# Patient Record
Sex: FEMALE | Race: WHITE | NOT HISPANIC OR LATINO | Employment: FULL TIME | URBAN - METROPOLITAN AREA
[De-identification: names, ages, dates, MRNs, and addresses within clinical notes are randomized per-mention and may not be internally consistent; named-entity substitution may affect disease eponyms.]

---

## 2017-08-30 ENCOUNTER — OFFICE VISIT (OUTPATIENT)
Dept: FAMILY MEDICINE | Facility: CLINIC | Age: 32
End: 2017-08-30

## 2017-08-30 VITALS
WEIGHT: 114.1 LBS | HEIGHT: 65 IN | DIASTOLIC BLOOD PRESSURE: 57 MMHG | HEART RATE: 79 BPM | BODY MASS INDEX: 19.01 KG/M2 | SYSTOLIC BLOOD PRESSURE: 99 MMHG | TEMPERATURE: 98 F | OXYGEN SATURATION: 98 % | RESPIRATION RATE: 20 BRPM

## 2017-08-30 DIAGNOSIS — Z32.01 PREGNANCY TEST POSITIVE: Primary | ICD-10-CM

## 2017-08-30 LAB — BETA HCG QUAL IFA URINE: POSITIVE

## 2017-08-30 PROCEDURE — 84703 CHORIONIC GONADOTROPIN ASSAY: CPT | Performed by: PHYSICIAN ASSISTANT

## 2017-08-30 PROCEDURE — 99213 OFFICE O/P EST LOW 20 MIN: CPT | Performed by: PHYSICIAN ASSISTANT

## 2017-08-30 ASSESSMENT — PAIN SCALES - GENERAL: PAINLEVEL: NO PAIN (0)

## 2017-08-30 NOTE — NURSING NOTE
"Chief Complaint   Patient presents with     possible pregnancy     LMP 7/10/2017       Initial BP 99/57 (BP Location: Right arm, Patient Position: Chair, Cuff Size: Adult Regular)  Pulse 79  Temp 98  F (36.7  C) (Oral)  Resp 20  Ht 5' 5\" (1.651 m)  Wt 114 lb 1.6 oz (51.8 kg)  LMP 07/10/2017  SpO2 98%  BMI 18.99 kg/m2 Estimated body mass index is 18.99 kg/(m^2) as calculated from the following:    Height as of this encounter: 5' 5\" (1.651 m).    Weight as of this encounter: 114 lb 1.6 oz (51.8 kg).  Medication Reconciliation: complete   Doris Tarango CMA        "

## 2017-08-30 NOTE — MR AVS SNAPSHOT
"              After Visit Summary   8/30/2017    Caprice Brenner    MRN: [id number]           Patient Information     Date Of Birth          1985        Visit Information        Provider Department      8/30/2017 1:20 PM Luis Eduardo Schwab PA-C [facility]        Today's Diagnoses     Pregnancy test positive    -  1       Follow-ups after your visit        Your next 10 appointments already scheduled     Sep 13, 2017 11:15 AM CDT   New Prenatal with Matthew Stein MD   [facility]    [address] [place] [id number] [phone]              Who to contact     If you have questions or need follow up information about today's clinic visit or your schedule please contact [facility] directly at [phone].  Normal or non-critical lab and imaging results will be communicated to you by MyChart, letter or phone within 4 business days after the clinic has received the results. If you do not hear from us within 7 days, please contact the clinic through MyChart or phone. If you have a critical or abnormal lab result, we will notify you by phone as soon as possible.  Submit refill requests through [employer] or call your pharmacy and they will forward the refill request to us. Please allow 3 business days for your refill to be completed.          Additional Information About Your Visit        MyChart Information     [employer] lets you send messages to your doctor, view your test results, renew your prescriptions, schedule appointments and more. To sign up, go to [email or web address] . Click on \"Log in\" on the left side of the screen, which will take you to the Welcome page. Then click on \"Sign up Now\" on the right side of the page.     You will be asked to enter the access code listed below, as well as some personal information. Please follow the directions to create your username and password.   " "  Your access code is: [email or web address]  Expires: 2017  2:25 PM     Your access code will  in 90 days. If you need help or a new code, please call your [place] clinic or [phone].        Care EveryWhere ID     This is your Care EveryWhere ID. This could be used by other organizations to access your [place] medical records  [id number]        Your Vitals Were     Pulse Temperature Respirations Height Last Period Pulse Oximetry    79 98  F (36.7  C) (Oral) 20 5' 5\" (1.651 m) 07/10/2017 98%    BMI (Body Mass Index)                   18.99 kg/m2            Blood Pressure from Last 3 Encounters:   17 99/57    Weight from Last 3 Encounters:   17 114 lb 1.6 oz (51.8 kg)              We Performed the Following     Beta HCG qual IFA urine        Primary Care Provider    No Pcp Confirmed       No address on file        Equal Access to Services     JUAN RAMON COHEN : Hadii aad ku hadasho Sobernaali, waaxda luqadaha, qaybta kaalmada adeegyada, waxay olivein haychristiann juan luis marino . So [facility] [phone].    ATENCIÓN: Si habla español, tiene a vargas disposición servicios gratuitos de asistencia lingüística. David al [phone].    We comply with applicable federal civil rights laws and [place] laws. We do not discriminate on the basis of race, color, national origin, age, disability sex, sexual orientation or gender identity.            Thank you!     Thank you for choosing [facility]  for your care. Our goal is always to provide you with excellent care. Hearing back from our patients is one way we can continue to improve our services. Please take a few minutes to complete the written survey that you may receive in the mail after your visit with us. Thank you!             Your Updated Medication List - Protect others around you: Learn how to safely use, store and throw away your medicines at www.disposemymeds.org.      Notice  As of 2017  2:25 PM    You have not been prescribed " any medications.

## 2017-08-30 NOTE — PROGRESS NOTES
"  SUBJECTIVE:   Caprice Brenner is a 32 year old female who presents to clinic today for the following health issues:      Pregnancy trest, LMP 7/10/2017, 3 positive home tests.         Problem list and histories reviewed & adjusted, as indicated.  Additional history: Patient has been trying to conceive.       Reviewed and updated as needed this visit by clinical staff  Allergies  Meds       ROS:  Constitutional, HEENT, cardiovascular, pulmonary, gi and gu systems are negative, except as otherwise noted.      OBJECTIVE:   BP 99/57 (BP Location: Right arm, Patient Position: Chair, Cuff Size: Adult Regular)  Pulse 79  Temp 98  F (36.7  C) (Oral)  Resp 20  Ht 5' 5\" (1.651 m)  Wt 114 lb 1.6 oz (51.8 kg)  LMP 07/10/2017  SpO2 98%  BMI 18.99 kg/m2  Body mass index is 18.99 kg/(m^2).    Total visit time is 15 Minutes with > 10 Minutes spent in care and consultation regarding Positive pregnancy testing with Prenatal vit orders and follow up plan.      Diagnostic Test Results:  Results for orders placed or performed in visit on 08/30/17 (from the past 24 hour(s))   Beta HCG qual IFA urine   Result Value Ref Range    Beta HCG Qual IFA Urine Positive (A) NEG^Negative          ASSESSMENT/PLAN:   1. Pregnancy test positive  - Beta HCG qual IFA urine    Schedule first OB PE.  Patient amenable to this follow up plan.     Luis Eduardo Schwab PA-C  [facility]  "

## 2017-09-05 ENCOUNTER — TELEPHONE (OUTPATIENT)
Dept: OBGYN | Facility: CLINIC | Age: 32
End: 2017-09-05

## 2017-09-05 NOTE — TELEPHONE ENCOUNTER
Patient called back.  She stated she is wanting to have an ultrasound done.  Her family members and friends have had ultrasounds early in pregnancy so she would like one too.  She is not sure of her LMP but indicated at the confirmation appointment that it was around 7/10/2017.  I advised patient to discuss this at the upcoming visit.  I put the request in her appointment notes.  Patient denied concerns with this pregnancy.    Jacinta Nelson RN

## 2017-09-05 NOTE — TELEPHONE ENCOUNTER
Reason for Call:  Other appointment    Detailed comments: Antonia called regarding her upcoming OB/GYN appointment on 09/13. She would like to get an ultrasound done at this appointment. Please call and let her know if this is at all possible.     Phone Number Patient can be reached at: Home number on file [phone] (home)    Best Time: Any    Can we leave a detailed message on this number? YES    Call taken on 9/5/2017 at 11:22 AM by Leta Cooper

## 2017-09-05 NOTE — TELEPHONE ENCOUNTER
I left message on Voicemail that US usually is not ordered ahead of time. First establish care with Dr. Stein for 1st OB and he will listen for heart tones and discuss if US needed at the visit.    Told to return call if she has further questions.  EVAN Diaz 9/5/2017

## 2017-09-06 ENCOUNTER — TELEPHONE (OUTPATIENT)
Dept: FAMILY MEDICINE | Facility: CLINIC | Age: 32
End: 2017-09-06

## 2017-09-06 NOTE — TELEPHONE ENCOUNTER
Reason for Call:  Other Fax    Detailed comments: Pt calling for she would like a copy of confirmation of her pregnancy faxed to fax # [phone] attn: Nanci Jiang    Phone Number Patient can be reached at: Home number on file [phone] (home)    Best Time: anytime    Can we leave a detailed message on this number? YES    Call taken on 9/6/2017 at 2:18 PM by Gentry Franco

## 2023-01-30 ENCOUNTER — TELEPHONE (OUTPATIENT)
Dept: DERMATOLOGY | Facility: CLINIC | Age: 38
End: 2023-01-30
Payer: COMMERCIAL

## 2023-01-30 NOTE — TELEPHONE ENCOUNTER
Called and informed patient of aftercare with shave vs punch biopsy as well as not to submerge in water following biopsy until completely healed. Patient states she has a mole on her neck, eyebrow and chest that are growing to which she would like to be looked at. Patient is scheduled 3/27 with Dr. Dom Singh LPN

## 2023-01-30 NOTE — TELEPHONE ENCOUNTER
[facility] Call Center    Phone Message    May a detailed message be left on voicemail: no     Reason for Call: Other: Pt would like to know the aftercare for mole removal and what, if any, down time there is. Please call Pt back to discuss. Pt is scheduled for 3/27/23. Thank you.      Action Taken: Message routed to:  Adult Clinics: Dermatology p [phone]    Travel Screening: Not Applicable

## 2023-03-27 ENCOUNTER — OFFICE VISIT (OUTPATIENT)
Dept: DERMATOLOGY | Facility: CLINIC | Age: 38
End: 2023-03-27
Payer: COMMERCIAL

## 2023-03-27 DIAGNOSIS — L82.1 SEBORRHEIC KERATOSIS: ICD-10-CM

## 2023-03-27 DIAGNOSIS — D23.9 DERMAL NEVUS: ICD-10-CM

## 2023-03-27 DIAGNOSIS — L82.0 SEBORRHEIC KERATOSIS, INFLAMED: ICD-10-CM

## 2023-03-27 DIAGNOSIS — D22.9 MULTIPLE BENIGN NEVI: Primary | ICD-10-CM

## 2023-03-27 DIAGNOSIS — L81.4 SOLAR LENTIGO: ICD-10-CM

## 2023-03-27 PROCEDURE — 2894A DERMATOPATHOLOGY EXAM: CPT | Performed by: DERMATOLOGY

## 2023-03-27 PROCEDURE — 11305 SHAVE SKIN LESION 0.5 CM/<: CPT | Mod: XS | Performed by: DERMATOLOGY

## 2023-03-27 PROCEDURE — 17110 DESTRUCTION B9 LES UP TO 14: CPT | Performed by: DERMATOLOGY

## 2023-03-27 PROCEDURE — 88305 TISSUE EXAM BY PATHOLOGIST: CPT | Performed by: DERMATOLOGY

## 2023-03-27 PROCEDURE — 99203 OFFICE O/P NEW LOW 30 MIN: CPT | Mod: 25 | Performed by: DERMATOLOGY

## 2023-03-27 NOTE — PATIENT INSTRUCTIONS
Wound Care After a Biopsy    What is a skin biopsy?  A skin biopsy allows the doctor to examine a very small piece of tissue under the microscope to determine the diagnosis and the best treatment for the skin condition. A local anesthetic (numbing medicine)  is injected with a very small needle into the skin area to be tested. A small piece of skin is taken from the area. Sometimes a suture (stitch) is used.     What are the risks of a skin biopsy?  I will experience scar, bleeding, swelling, pain, crusting and redness. I may experience incomplete removal or recurrence. Risks of this procedure are excessive bleeding, bruising, infection, nerve damage, numbness, thick (hypertrophic or keloidal) scar and non-diagnostic biopsy.    How should I care for my wound for the first 24 hours?  Keep the wound dry and covered for 24 hours  If it bleeds, hold direct pressure on the area for 15 minutes. If bleeding does not stop then go to the emergency room  Avoid strenuous exercise the first 1-2 days or as your doctor instructs you    How should I care for the wound after 24 hours?  After 24 hours, remove the bandage  You may bathe or shower as normal  If you had a scalp biopsy, you can shampoo as usual and can use shower water to clean the biopsy site daily  Clean the wound twice a day with gentle soap and water  Do not scrub, be gentle  Apply white petroleum/Vaseline after cleaning the wound with a cotton swab or a clean finger, and keep the site covered with a Bandaid /bandage. Bandages are not necessary with a scalp biopsy  If you are unable to cover the site with a Bandaid /bandage, re-apply ointment 2-3 times a day to keep the site moist. Moisture will help with healing  Avoid strenuous activity for first 1-2 days  Avoid lakes, rivers, pools, and oceans until the stitches are removed or the site is healed    How do I clean my wound?  Wash hands thoroughly with soap or use hand  before all wound care  Clean the  wound with gentle soap and water  Apply white petroleum/Vaseline  to wound after it is clean  Replace the Bandaid /bandage to keep the wound covered for the first few days or as instructed by your doctor  If you had a scalp biopsy, warm shower water to the area on a daily basis should suffice    What should I use to clean my wound?   Cotton-tipped applicators (Qtips )  White petroleum jelly (Vaseline ). Use a clean new container and use Q-tips to apply.  Bandaids   as needed  Gentle soap     How should I care for my wound long term?  Do not get your wound dirty  Keep up with wound care for one week or until the area is healed.  A small scab will form and fall off by itself when the area is completely healed. The area will be red and will become pink in color as it heals. Sun protection is very important for how your scar will turn out. Sunscreen with an SPF 30 or greater is recommended once the area is healed.  You should have some soreness but it should be mild and slowly go away over several days. Talk to your doctor about using tylenol for pain,    When should I call my doctor?  If you have increased:   Pain or swelling  Pus or drainage (clear or slightly yellow drainage is ok)  Temperature over 100F  Spreading redness or warmth around wound    When will I hear about my results?  The biopsy results can take 2 weeks to come back.  Your results will automatically release to [employer] before your provider has even reviewed them.  The clinic will call you with the results, send you a [facility] message, or have you schedule a follow-up clinic or phone time to discuss the results.  Contact our clinics if you do not hear from us in 2 weeks.    Who should I call with questions?  [facility]: [phone]  [facility]: [phone]  For urgent needs outside of business hours call the [facility] at [phone] and ask for the dermatology resident on call      Cryotherapy    What is it?  Use of a very cold liquid, such as liquid nitrogen, to freeze and destroy abnormal skin cells that need to be removed    What should I expect?  Tenderness and redness  A small blister that might grow and fill with dark purple blood. There may be crusting.  More than one treatment may be needed if the lesions do not go away.    How do I care for the treated area?  Gently wash the area with your hands when bathing.  Use a thin layer of Vaseline to help with healing. You may use a Band-Aid.   The area should heal within 7-10 days and may leave behind a pink or lighter color.   Do not use an antibiotic or Neosporin ointment.   You may take acetaminophen (Tylenol) for pain.     Call your doctor if you have:  Severe pain  Signs of infection (warmth, redness, cloudy yellow drainage, and or a bad smell)  Questions or concerns    Who should I call with questions?      [facility]: [phone]      [facility]: [phone]      For urgent needs outside of business hours call the [facility] at [phone] and ask for the dermatology resident on call

## 2023-03-27 NOTE — LETTER
3/27/2023         RE: Chelsey Brenner  [address]  [place] [address]        Dear Colleague,    Thank you for referring your patient, Chelsey Brenner, to the [facility]. Please see a copy of my visit note below.    [facility] Dermatology Note  Encounter Date: Mar 27, 2023  Office Visit     Dermatology Problem List:  0. NUB - left neck superior and left neck inferior, s/p bx 3/27/23   1. Dermal nevus - left medial eyebrow  - will contact clinic for excision  2. ISK, s/p cryo    ____________________________________________    Assessment & Plan:    # Neoplasm of unspecified behavior of the skin (D49.2) on the left neck superior. The differential diagnosis includes irritated dermal nevus vs other.  - Photograph obtained.  - See procedure note.    # Neoplasm of unspecified behavior of the skin (D49.2) on the left neck inferior. The differential diagnosis includes irritated dermal nevus vs other.  - Photograph obtained.  - See procedure note.     # Seborrheic keratosis, symptomatic - central chest x 1. Based on the location and chronic irritation to this lesion, treatment with cryotherapy is warranted.   - See cryo procedure note.     # Dermal nevus, irritated - left medial eyebrow. Reviewed best cosmetic result would be from surgical excision with derm surgery, shave biopsy could be performed today but would result in a scar. After discussion, patient will consider excision.  - Photograph obtained.  - Contact clinic if interested in excision with derm surg.     Procedures Performed:   - Cryotherapy procedure note, location(s): central chest. After verbal consent and discussion of risks and benefits including, but not limited to, dyspigmentation/scar, blister, and pain, 1 ISK(s) was(were) treated with 1-2 mm freeze border for 1-2 cycles with liquid nitrogen. Post cryotherapy instructions were provided.     - Shave biopsy procedure note,  "location(s): left neck superior and left neck inferior. After discussion of benefits and risks including but not limited to bleeding, infection, scar, incomplete removal, recurrence, and non-diagnostic biopsy, written consent and photographs were obtained. The area was cleaned with isopropyl alcohol. 0.5mL of 1% lidocaine with epinephrine was injected to obtain adequate anesthesia of lesion(s). Shave biopsy at site(s) performed. Hemostasis was achieved with aluminium chloride. Petrolatum ointment and a sterile dressing were applied. The patient tolerated the procedure and no complications were noted. The patient was provided with verbal and written post care instructions.      Follow-up: pending path results    Staff and Scribe:     Scribe Disclosure:   I, Enoch Constantino, am serving as a scribe to document services personally performed by this physician, Dr. Nik Fernandes, based on data collection and the provider's statements to me.     Provider Disclosure:   The documentation recorded by the scribe accurately reflects the services I personally performed and the decisions made by me.    Nik Fernandes MD    Department of Dermatology  [facility]: Phone: [phone], Fax:[phone]  [facility]: Phone: [phone] Fax: [phone]  ____________________________________________    CC: Skin Check (Patient here for a mole on neck and one on chest, areas of concern mostly the neck and eyebrow. No family history of skin cancer)    HPI:  Ms. Chelsey Brenner is a(n) 37 year old female who presents today as a new patient for a spot check.    Self referred.     Today, she is concerned about a mole on the neck that is getting big and is \"in the way\" when she is brushing her hair. It has bled in the past as well.   She also notes a mole on the chest that is scaly and itching, and appears " similar to a wart on her finger.   She is also worried about a spot on the left eyebrow that bleeds and gets irritated when she puts makeup on.     The patient otherwise denies any new or concerning lesions. No bleeding, painful, pruritic, or changing lesions. Health otherwise stable. No other skin concerns.       Labs Reviewed:  N/A    Physical Exam:  Vitals: There were no vitals taken for this visit.  SKIN: Waist-up skin, which includes the head/face, neck, both arms, chest, back, abdomen, digits and/or nails was examined.    - 3-4 mm pink to light brown papule on the left medial eyebrow  - 4-5 mm skin colored to light brown smooth papule on the right neck superior  - 4-5 mm skin colored to light brown smooth papule on the left neck inferior  - There are tan to brown waxy stuck on papules with surrounding erythema on the central chest x 1.    - No other lesions of concern on areas examined.     Medications:  No current outpatient medications on file.     No current facility-administered medications for this visit.      Past Medical History:   Patient Active Problem List   Diagnosis     CARDIOVASCULAR SCREENING; LDL GOAL LESS THAN 160     No past medical history on file.         Again, thank you for allowing me to participate in the care of your patient.        Sincerely,        Nik Fernandes MD

## 2023-03-27 NOTE — PROGRESS NOTES
[facility] Dermatology Note  Encounter Date: Mar 27, 2023  Office Visit     Dermatology Problem List:  0. NUB - left neck superior and left neck inferior, s/p bx 3/27/23   1. Dermal nevus - left medial eyebrow  - will contact clinic for excision  2. ISK, s/p cryo    ____________________________________________    Assessment & Plan:    # Irritated dermal nevus, left neck superior.   - Photograph obtained.  - Shave removal as below given symptomatic. See procedure note.    # Irritated dermal nevus, left neck inferior. .  - Photograph obtained.  - Shave removal as below given symptomatic. See procedure note.     # Seborrheic keratosis, symptomatic - central chest x 1. Based on the location and chronic irritation to this lesion, treatment with cryotherapy is warranted.   - See cryo procedure note.     # Dermal nevus, irritated - left medial eyebrow. Reviewed best cosmetic result would be from surgical excision with derm surgery, shave biopsy could be performed today but would result in a scar. After discussion, patient will consider excision.  - Photograph obtained.  - Contact clinic if interested in excision with derm surg.     #Benign lesions: Multiple benign nevi, solar lentigos, seborrheic keratoses. Explained to patient benign nature of lesion. No treatment is necessary at this time unless the lesion changes or becomes symptomatic.   - ABCDs of melanoma were discussed and self skin checks were advised.  - Sun precaution was advised including the use of sun screens of SPF 30 or higher, sun protective clothing, and avoidance of tanning beds.    Procedures Performed:   - Cryotherapy procedure note, location(s): central chest. After verbal consent and discussion of risks and benefits including, but not limited to, dyspigmentation/scar, blister, and pain, 1 ISK(s) was(were) treated with 1-2 mm freeze border for 1-2 cycles with liquid nitrogen. Post cryotherapy instructions were provided.     - Shave  "removal: Location(s) left neck superior, left neck inferior.   After discussion of benefits and risks including but not limited to bleeding/bruising, pain/swelling, infection, scar, incomplete removal, nerve damage/numbness, recurrence, and non-diagnostic biopsy, written consent, verbal consent and photographs were obtained. Time-out was performed. The area was cleaned with isopropyl alcohol. 0.5mL of 1% lidocaine with epinephrine was injected to obtain adequate anesthesia of each lesion. Shave removal was performed. Hemostasis was achieved with aluminium chloride. Vaseline and a sterile dressing were applied. The patient tolerated the procedure and no complications were noted. The patient was provided with verbal and written post care instructions.      Follow-up: pending path results    Staff and Scribe:     Scribe Disclosure:   I, Enoch Constantino, am serving as a scribe to document services personally performed by this physician, Dr. Nik Fernandes, based on data collection and the provider's statements to me.     Provider Disclosure:   The documentation recorded by the scribe accurately reflects the services I personally performed and the decisions made by me.    Nik Fernandes MD    Department of Dermatology  [facility]: Phone: [phone], Fax:[phone]  [facility]: Phone: [phone] Fax: [phone]  ____________________________________________    CC: Skin Check (Patient here for a mole on neck and one on chest, areas of concern mostly the neck and eyebrow. No family history of skin cancer)    HPI:  Ms. Chelsey Brenner is a(n) 37 year old female who presents today as a new patient for a spot check.    Self referred.     Today, she is concerned about a mole on the neck that is getting big and is \"in the way\" when she is brushing her hair. It has bled in the past as well. "   She also notes a mole on the chest that is scaly and itching, and appears similar to a wart on her finger.   She is also worried about a spot on the left eyebrow that bleeds and gets irritated when she puts makeup on.     The patient otherwise denies any new or concerning lesions. No bleeding, painful, pruritic, or changing lesions. Health otherwise stable. No other skin concerns.       Labs Reviewed:  N/A    Physical Exam:  Vitals: There were no vitals taken for this visit.  SKIN: Waist-up skin, which includes the head/face, neck, both arms, chest, back, abdomen, digits and/or nails was examined.    - 3-4 mm pink to light brown papule on the left medial eyebrow  - 4-5 mm skin colored to light brown smooth papule on the right neck superior  - 4-5 mm skin colored to light brown smooth papule on the left neck inferior  - There are tan to brown waxy stuck on papules with surrounding erythema on the central chest x 1.    - No other lesions of concern on areas examined.     Medications:  No current outpatient medications on file.     No current facility-administered medications for this visit.      Past Medical History:   Patient Active Problem List   Diagnosis     CARDIOVASCULAR SCREENING; LDL GOAL LESS THAN 160     No past medical history on file.

## 2023-03-27 NOTE — NURSING NOTE
Chelsey Brenner's goals for this visit include:   Chief Complaint   Patient presents with     Skin Check     Patient here for a mole on neck and one on chest, areas of concern mostly the neck and eyebrow. No family history of skin cancer       She requests these members of her care team be copied on today's visit information:     PCP: No Ref-Primary, Physician    Referring Provider:  Referred Self, MD  No address on file    There were no vitals taken for this visit.    Do you need any medication refills at today's visit? Ria Crane EMT

## 2023-03-27 NOTE — NURSING NOTE
The following medication was given:     MEDICATION:  Lidocaine with epinephrine 1% [id number]  ROUTE: SQ  SITE: see procedure note  DOSE: 2ml  LOT #: [id number]  : [employer]  EXPIRATION DATE: 09/30/2024  NDC#: 50174-768-30  Was there drug waste? Yes, 0.5ml  Multi-dose vial: Yes    Maria L Crane  March 27, 2023

## 2023-03-29 LAB
PATH REPORT.COMMENTS IMP SPEC: NORMAL
PATH REPORT.COMMENTS IMP SPEC: NORMAL
PATH REPORT.FINAL DX SPEC: NORMAL
PATH REPORT.GROSS SPEC: NORMAL
PATH REPORT.MICROSCOPIC SPEC OTHER STN: NORMAL
PATH REPORT.RELEVANT HX SPEC: NORMAL

## 2023-03-29 NOTE — RESULT ENCOUNTER NOTE
Can we call patient and let her know that both the spot son the right neck were consistent with benign intradermal nevi (or moles). No further treatment is needed at this time.     Nik Fernandes MD  Pronouns: he/him/his    Department of Dermatology  [facility]: Phone: [phone], Fax:[phone]  [facility]: Phone: [phone] Fax: [phone]

## 2023-03-30 ENCOUNTER — TELEPHONE (OUTPATIENT)
Dept: DERMATOLOGY | Facility: CLINIC | Age: 38
End: 2023-03-30
Payer: COMMERCIAL

## 2023-03-30 NOTE — TELEPHONE ENCOUNTER
Pt called and notified of pathology results. Pt had questions about after care for biopsy site. Explained care for site. Pt will follow up with any other questions or concerns.     Alesha Yusuf RN on 3/30/2023 at 9:09 AM

## 2023-04-19 ENCOUNTER — TELEPHONE (OUTPATIENT)
Dept: DERMATOLOGY | Facility: CLINIC | Age: 38
End: 2023-04-19
Payer: COMMERCIAL

## 2023-04-19 NOTE — TELEPHONE ENCOUNTER
Called pt to rescheduled 6/7 appointment due to provider being out. Left message for her to return our call at [phone].    Pt needs mole removal follow up appointment. Dr Adame is dong mole removal, see if we can schedule with Deep to do follow up as well.    Cancelled pt's appointment.    Alesha Yusuf RN on 4/19/2023 at 8:33 AM

## 2023-04-24 NOTE — TELEPHONE ENCOUNTER
Pt called and rescheduled with Dr Adame for wound check follow up.    Alesha Yusuf RN on 4/24/2023 at 8:55 AM

## 2023-05-16 ENCOUNTER — TELEPHONE (OUTPATIENT)
Dept: DERMATOLOGY | Facility: CLINIC | Age: 38
End: 2023-05-16
Payer: COMMERCIAL

## 2023-05-16 NOTE — TELEPHONE ENCOUNTER
Excision/Mohs previsit information                                                    Diagnosis: irritated nevus  Site(s): left medial eyebrow    Over the counter Chlorhexidine surgical soap to wash all skin below the belly button twice before surgery should be recommended for the following:  - Surgical sites below the waist  - Immunosuppressed  - Previous surgical site infection  - Anticipated wound care challenges    Medication & Allergy Information                                                      Review and update allergy and medication list.    Do you take the following medications:    Coumadin, Eliquis, Pradaxa, Xarelto:  NO   -If on Coumadin, INR should be checked within 7 days of surgery.  Range should be 3.5 or less or within therapeutic range.    Past Medical History                                                    Do you currently or have you previously had any of the following conditions:      Hepatitis:  NO    HIV/AIDS:  NO    Prolonged bleeding or bleeding disorder:  NO    Pacemaker or Defibrillator:  NO.      History of artificial or heart valve replacement:  NO    Endocarditis (inflammation of the inner lining of the heart's chambers and valves):  NO    Have you ever had a prosthetic joint infection:  NO    Pregnant or Breastfeeding:  NO    Mobility device (wheelchair, transfer difficulty): NO    Important Reminders:                                                        Ok to take all of their medications as prescribed    Patients can eat, no need to be fasting    Patient will not be able to get the site wet for 48 hrs    No submerging wound in standing water (lake, pool, bathtub, hot tub) for 2 weeks    No physical activity for 48 hrs (further restrictions will be discussed by MD at time of visit)    Judith Villalpando RN

## 2023-05-19 NOTE — PROGRESS NOTES
[facility] Dermatology Note  Encounter Date: May 22, 2023  Office Visit     Dermatology Problem List:  1. Dermal nevus - left medial eyebrow  - will contact clinic for excision  2. ISK, s/p cryo  3. History of benign biopsies  - Intradermal melanocytic nevus - right neck superior, s/p bx 3/27/23  - Intradermal melanocytic nevus - right neck inferior, s/p bx 3/27/23  ____________________________________________    Assessment & Plan:    # Dermal nevus - left medial eyebrow.   - thought the clinical appearance argues for a benign lesion, intermittent bleeding and continued growth (at age 37) might argue for tissue sampling.     - The nature, risks, benefits, and alternatives to excision surgery were discussed. The patient would like to consider options before proceed with excision surgery. She is schedule for excision later this week.     - Discussed with pt that excision procedure will leave a scar and estimated the size with ink. Discussed with pt difference between shaving and excising the lesion. SmoothBeam Laser can be effective flattening intradermal nevi. She could consider seeing Dr. Smith at [facility] for a non-scalpel treatment option.     Procedures Performed:   None.    Follow-up: For excision    Staff and Scribe:     Scribe Disclosure:   I, Dahiana Arriaga, am serving as a scribe to document services personally performed by this physician, Dr. Shady Adame, based on data collection and the provider's statements to me.      Shady Adame DO    Department of Dermatology  [facility]: Phone: [phone], Fax:[phone]  [facility]: Phone: [phone], Fax: [phone]      ____________________________________________    CC: Consult For (Discuss treatment option for mole removal on left medial eyebrow.  Occasionally bleeds.)    HPI:  Ms. Chelsey  "Akua Brenner is a(n) 37 year old female who presents today as a return patient for a consult. Pt reported the spot bleeds on left eyebrow at times and is \"getting in the way\". At times applying make up, it has bled. It seems to be growing over the past few years. It is more visually apparent and give the appearance that hair isn't growing in her eyebrow.     She katarzyna to a  to have her eyebrows darkened. She had the procedure, but the ink didn't remain.     Last seen 3/27/23 by Dr. Fernandes for a skin check. At that time, 2 biopsies were obtained.   Patient is otherwise feeling well, without additional skin concerns.    Labs Reviewed:  N/A    Physical Exam:  Vitals: There were no vitals taken for this visit.  SKIN: Focused examination of the left medial eyebrow was performed.  - Regular skin colored 6mm papule with central tan macule on the left medial eyebrow.   - No other lesions of concern on areas examined.     Medications:  Current Outpatient Medications   Medication     SPIRONOLACTONE PO     No current facility-administered medications for this visit.      Past Medical History:   Patient Active Problem List   Diagnosis     CARDIOVASCULAR SCREENING; LDL GOAL LESS THAN 160       "

## 2023-05-22 ENCOUNTER — OFFICE VISIT (OUTPATIENT)
Dept: DERMATOLOGY | Facility: CLINIC | Age: 38
End: 2023-05-22
Payer: COMMERCIAL

## 2023-05-22 DIAGNOSIS — D23.9 INTRADERMAL NEVUS: Primary | ICD-10-CM

## 2023-05-22 PROCEDURE — 99213 OFFICE O/P EST LOW 20 MIN: CPT | Performed by: DERMATOLOGY

## 2023-05-22 NOTE — LETTER
5/22/2023         RE: Chelsey Brenner  [address]  [place] [address]        Dear Colleague,    Thank you for referring your patient, Chelsey Brenner, to the [facility]. Please see a copy of my visit note below.    [facility] Dermatology Note  Encounter Date: May 22, 2023  Office Visit     Dermatology Problem List:  1. Dermal nevus - left medial eyebrow  - will contact clinic for excision  2. ISK, s/p cryo  3. History of benign biopsies  - Intradermal melanocytic nevus - right neck superior, s/p bx 3/27/23  - Intradermal melanocytic nevus - right neck inferior, s/p bx 3/27/23  ____________________________________________    Assessment & Plan:    # Dermal nevus - left medial eyebrow.   - thought the clinical appearance argues for a benign lesion, intermittent bleeding and continued growth (at age 37) might argue for tissue sampling.     - The nature, risks, benefits, and alternatives to excision surgery were discussed. The patient would like to consider options before proceed with excision surgery. She is schedule for excision later this week.     - Discussed with pt that excision procedure will leave a scar and estimated the size with ink. Discussed with pt difference between shaving and excising the lesion. SmoothBeam Laser can be effective flattening intradermal nevi. She could consider seeing Dr. Smith at [facility] for a non-scalpel treatment option.     Procedures Performed:   None.    Follow-up: For excision    Staff and Scribe:     Scribe Disclosure:   I, Dahiana Arriaga, am serving as a scribe to document services personally performed by this physician, Dr. Shady Adame, based on data collection and the provider's statements to me.      Shady Adame DO    Department of Dermatology  [facility]: Phone: [phone], Fax:[phone]  [place]  "[facility]: Phone: [phone], Fax: [phone]      ____________________________________________    CC: Consult For (Discuss treatment option for mole removal on left medial eyebrow.  Occasionally bleeds.)    HPI:  Ms. Chelsey Brenner is a(n) 37 year old female who presents today as a return patient for a consult. Pt reported the spot bleeds on left eyebrow at times and is \"getting in the way\". At times applying make up, it has bled. It seems to be growing over the past few years. It is more visually apparent and give the appearance that hair isn't growing in her eyebrow.     She katarzyna to a  to have her eyebrows darkened. She had the procedure, but the ink didn't remain.     Last seen 3/27/23 by Dr. Fernandes for a skin check. At that time, 2 biopsies were obtained.   Patient is otherwise feeling well, without additional skin concerns.    Labs Reviewed:  N/A    Physical Exam:  Vitals: There were no vitals taken for this visit.  SKIN: Focused examination of the left medial eyebrow was performed.  - Regular skin colored 6mm papule with central tan macule on the left medial eyebrow.   - No other lesions of concern on areas examined.     Medications:  Current Outpatient Medications   Medication     SPIRONOLACTONE PO     No current facility-administered medications for this visit.      Past Medical History:   Patient Active Problem List   Diagnosis     CARDIOVASCULAR SCREENING; LDL GOAL LESS THAN 160         Duplicate note opened in error.     Again, thank you for allowing me to participate in the care of your patient.        Sincerely,        Shady Adame MD    "

## 2023-05-22 NOTE — NURSING NOTE
Chelsey Brenner's goals for this visit include:   Chief Complaint   Patient presents with     Consult For     Discuss treatment option for mole removal on left medial eyebrow.  Occasionally bleeds.       She requests these members of her care team be copied on today's visit information: n/a    PCP: No Ref-Primary, Physician    Referring Provider:  No referring provider defined for this encounter.    There were no vitals taken for this visit.    Do you need any medication refills at today's visit? No  Judith Villalpando RN